# Patient Record
Sex: MALE | Race: WHITE | URBAN - METROPOLITAN AREA
[De-identification: names, ages, dates, MRNs, and addresses within clinical notes are randomized per-mention and may not be internally consistent; named-entity substitution may affect disease eponyms.]

---

## 2017-09-07 ENCOUNTER — OFFICE VISIT (OUTPATIENT)
Dept: ORTHOPEDIC SURGERY | Age: 52
End: 2017-09-07

## 2017-09-07 VITALS — HEIGHT: 68 IN | WEIGHT: 158 LBS | BODY MASS INDEX: 23.95 KG/M2

## 2017-09-07 DIAGNOSIS — M72.2 PLANTAR FASCIITIS, RIGHT: Primary | ICD-10-CM

## 2017-09-07 DIAGNOSIS — M79.671 PAIN OF RIGHT HEEL: ICD-10-CM

## 2017-09-07 PROCEDURE — 99203 OFFICE O/P NEW LOW 30 MIN: CPT | Performed by: INTERNAL MEDICINE

## 2017-09-07 PROCEDURE — L3040 FT ARCH SUPRT PREMOLD LONGIT: HCPCS | Performed by: INTERNAL MEDICINE

## 2017-09-07 PROCEDURE — 73650 X-RAY EXAM OF HEEL: CPT | Performed by: INTERNAL MEDICINE

## 2017-09-07 RX ORDER — ATORVASTATIN CALCIUM 20 MG/1
TABLET, FILM COATED ORAL
COMMUNITY
Start: 2017-08-21

## 2017-09-07 RX ORDER — NIACIN 750 MG/1
TABLET, EXTENDED RELEASE ORAL
COMMUNITY
Start: 2017-08-21

## 2017-09-07 RX ORDER — ASPIRIN 300 MG/1
300 SUPPOSITORY RECTAL EVERY 6 HOURS PRN
COMMUNITY
End: 2017-09-07 | Stop reason: CLARIF

## 2017-09-10 RX ORDER — MELOXICAM 15 MG/1
15 TABLET ORAL DAILY
Qty: 30 TABLET | Refills: 1 | Status: SHIPPED | OUTPATIENT
Start: 2017-09-10

## 2017-09-18 ENCOUNTER — HOSPITAL ENCOUNTER (OUTPATIENT)
Dept: PHYSICAL THERAPY | Age: 52
Discharge: HOME | End: 2017-09-30
Admitting: INTERNAL MEDICINE

## 2017-09-18 NOTE — FLOWSHEET NOTE
[place]/Biofeedback 10/10       G. Med activaiton/sidelying       G. Max Activation/prone       Hip Ext full ROM G. Activation       Bosu Bal and Prop- G Med       Single leg stance/Balance/Prop       Bosu Retro G. Med act                         Therapeutic Exercise and NMR EXR  [] (01033) Provided verbal/tactile cueing for activities related to strengthening, flexibility, endurance, ROM for improvements in LE, proximal hip, and core control with self care, mobility, lifting, ambulation.  [] (33768) Provided verbal/tactile cueing for activities related to improving balance, coordination, kinesthetic sense, posture, motor skill, proprioception  to assist with LE, proximal hip, and core control in self care, mobility, lifting, ambulation and eccentric single leg control.      NMR and Therapeutic Activities:    [] (37821 or 44428) Provided verbal/tactile cueing for activities related to improving balance, coordination, kinesthetic sense, posture, motor skill, proprioception and motor activation to allow for proper function of core, proximal hip and LE with self care and ADLs  [] (67877) Gait Re-education- Provided training and instruction to the patient for proper LE, core and proximal hip recruitment and positioning and eccentric body weight control with ambulation re-education including up and down stairs     Home Exercise Program:    [x] (77301) Reviewed/Progressed HEP activities related to strengthening, flexibility, endurance, ROM of core, proximal hip and LE for functional self-care, mobility, lifting and ambulation/stair navigation   [] (02815)Reviewed/Progressed HEP activities related to improving balance, coordination, kinesthetic sense, posture, motor skill, proprioception of core, proximal hip and LE for self care, mobility, lifting, and ambulation/stair navigation      Manual Treatments:  PROM / STM / Oscillations-Mobs:  G-I, II, III, IV (PA's, Inf., Post.)  [] (23795) Provided manual therapy to mobilize treatment well [] Patient limited by fatique  [] Patient limited by pain  [] Patient limited by other medical complications  [] Other:     Prognosis: [] Good [] Fair  [] Poor    Patient Requires Follow-up: [x] Yes  [] No    PLAN: See eval  [] Continue per plan of care [] Alter current plan (see comments)  [x] Plan of care initiated [] Hold pending MD visit [] Discharge    Electronically signed by: Sara Garber PT

## 2017-09-25 ENCOUNTER — HOSPITAL ENCOUNTER (OUTPATIENT)
Dept: PHYSICAL THERAPY | Age: 52
Discharge: HOME | End: 2017-09-25
Admitting: INTERNAL MEDICINE

## 2017-10-02 ENCOUNTER — HOSPITAL ENCOUNTER (OUTPATIENT)
Dept: PHYSICAL THERAPY | Age: 52
Discharge: HOME | End: 2017-10-02
Admitting: INTERNAL MEDICINE

## 2017-10-02 NOTE — FLOWSHEET NOTE
[place] , [facility]    Physical Therapy Daily Treatment Note  Date:  10/2/2017    Patient Name:  Haroon Penaloza    :  1965  MRN: [id number]  Restrictions/Precautions:    Medical/Treatment Diagnosis Information:  · Diagnosis: R plantar fasciitis  · Treatment Diagnosis: R foot pain  Insurance/Certification information:  PT Insurance Information: MVP - $0 deductible, $10 copay, 100% coinsurance, 240 PT/OT per calendar year  Physician Information:  Referring Practitioner: Dr. Russ Stuart of care signed (Y/N): N    Date of Patient follow up with Physician:     G-Code (if applicable):      Date G-Code Applied:         Progress Note: [x]  Yes  []  No  Next due by: Visit #10       Latex Allergy:  [x]NO      []YES  Preferred Language for Healthcare:   [x]English       []other:    Visit # Insurance Allowable   3 240     Pain level:  6/10     SUBJECTIVE:  Says that he had been feeling pretty good last week, but then was out with son at [place] over the weekend and did a lot of walking on the course and had some increased pain when he was finished, reminding him that it was not healed yet. OBJECTIVE: See eval  Observation:   Test measurements:      RESTRICTIONS/PRECAUTIONS: None    Exercises/Interventions:     Therapeutic Ex Resistance Sets/sec Reps Notes   Retro Stepper/BIKE       Sportcord March       3 way SLR       SAQ       Clam ABD       Hip Ext /table       Bosu fwd/side lunge       Slide Lunge       Leg Press Ecc 0-       Cybex HS curl       TKE       Slant board stretch Knee straight and knees bent 2' 1    Forward step downs 4\" 2 15    BOSU lunges Forward 1 20    Single leg stance BOSU 30\" 3    Mini squats BOSU 5\" 15    Manual Intervention       Knee mobs/PROM       Tib/Fem Mobs       Patella Mobs       Ankle mobs       Plantar fascia STM  10'     Dry neeling  15'     NMR re-education       [place] citizen/Biofeedback 10/10       G. Med activaiton/sidelying       G. Max Activation/prone       Hip Ext full ROM G. Activation       Bosu Bal and Prop- G Med       Single leg stance/Balance/Prop       Bosu Retro G. Med act       Ladder drills  10'                Therapeutic Exercise and NMR EXR  [] (18082) Provided verbal/tactile cueing for activities related to strengthening, flexibility, endurance, ROM for improvements in LE, proximal hip, and core control with self care, mobility, lifting, ambulation.  [] (50400) Provided verbal/tactile cueing for activities related to improving balance, coordination, kinesthetic sense, posture, motor skill, proprioception  to assist with LE, proximal hip, and core control in self care, mobility, lifting, ambulation and eccentric single leg control.      NMR and Therapeutic Activities:    [] (26339 or 25262) Provided verbal/tactile cueing for activities related to improving balance, coordination, kinesthetic sense, posture, motor skill, proprioception and motor activation to allow for proper function of core, proximal hip and LE with self care and ADLs  [] (15648) Gait Re-education- Provided training and instruction to the patient for proper LE, core and proximal hip recruitment and positioning and eccentric body weight control with ambulation re-education including up and down stairs     Home Exercise Program:    [x] (86973) Reviewed/Progressed HEP activities related to strengthening, flexibility, endurance, ROM of core, proximal hip and LE for functional self-care, mobility, lifting and ambulation/stair navigation   [] (38963)Reviewed/Progressed HEP activities related to improving balance, coordination, kinesthetic sense, posture, motor skill, proprioception of core, proximal hip and LE for self care, mobility, lifting, and ambulation/stair navigation      Manual Treatments:  PROM / STM / Oscillations-Mobs:  G-I, II, III, IV (PA's, Inf., Post.)  [x] (87044) Provided manual therapy to mobilize LE, proximal hip

## 2017-10-09 ENCOUNTER — HOSPITAL ENCOUNTER (OUTPATIENT)
Dept: PHYSICAL THERAPY | Age: 52
Discharge: HOME | End: 2017-10-09
Admitting: INTERNAL MEDICINE

## 2017-10-09 NOTE — FLOWSHEET NOTE
[facility] [address], [facility]    Physical Therapy Daily Treatment Note  Date:  10/9/2017    Patient Name:  Alida Palacios    :  1965  MRN: [id number]  Restrictions/Precautions:    Medical/Treatment Diagnosis Information:  · Diagnosis: R plantar fasciitis  · Treatment Diagnosis: R foot pain  Insurance/Certification information:  PT Insurance Information: MVP - $0 deductible, $10 copay, 100% coinsurance, 240 PT/OT per calendar year  Physician Information:  Referring Practitioner: Dr. Kerwin Yeh of care signed (Y/N): Y    Date of Patient follow up with Physician:     G-Code (if applicable):      Date G-Code Applied:         Progress Note: [x]  Yes  []  No  Next due by: Visit #10       Latex Allergy:  [x]NO      []YES  Preferred Language for Healthcare:   [x]English       []other:    Visit # Insurance Allowable   4 240     Pain level:  6/10     SUBJECTIVE:  Says that he is feeling okay today. Says that he was a little more sore yesterday without any real reason. Says that he was not up on his feet any more than usual yesterday    OBJECTIVE:   Observation:   Test measurements:      RESTRICTIONS/PRECAUTIONS: None    Exercises/Interventions:     Therapeutic Ex Resistance Sets/sec Reps Notes   Retro Stepper/BIKE       Sport[place] March       3 way SLR       SAQ       Clam ABD       Hip Ext /table       Bosu fwd/side lunge       Slide Lunge       Leg Press Ecc 0-       Cybex HS curl       TKE       Slant board stretch Knee straight and knees bent 2' 1    Forward step downs 4\" 2 15    BOSU lunges Forward 1 20    Single leg stance BOSU 30\" 3    Mini squats BOSU 5\" 15    Manual Intervention       Knee mobs/PROM       Tib/Fem Mobs       Patella Mobs       Ankle mobs       Plantar fascia STM  10'     Dry neeling  15'     NMR re-education       Citizen of [place]/Biofeedback 10/10       G. Med activaiton/sidelying       G. Max Activation/prone       Hip Ext full ROM G. Activation       Bosu Bal and Prop- G Med       Single leg stance/Balance/Prop       Bosu Retro G. Med act       Ladder drills  10'                Therapeutic Exercise and NMR EXR  [] (53976) Provided verbal/tactile cueing for activities related to strengthening, flexibility, endurance, ROM for improvements in LE, proximal hip, and core control with self care, mobility, lifting, ambulation.  [] (81124) Provided verbal/tactile cueing for activities related to improving balance, coordination, kinesthetic sense, posture, motor skill, proprioception  to assist with LE, proximal hip, and core control in self care, mobility, lifting, ambulation and eccentric single leg control.      NMR and Therapeutic Activities:    [] (07196 or 47603) Provided verbal/tactile cueing for activities related to improving balance, coordination, kinesthetic sense, posture, motor skill, proprioception and motor activation to allow for proper function of core, proximal hip and LE with self care and ADLs  [] (88867) Gait Re-education- Provided training and instruction to the patient for proper LE, core and proximal hip recruitment and positioning and eccentric body weight control with ambulation re-education including up and down stairs     Home Exercise Program:    [x] (36324) Reviewed/Progressed HEP activities related to strengthening, flexibility, endurance, ROM of core, proximal hip and LE for functional self-care, mobility, lifting and ambulation/stair navigation   [] (80738)Reviewed/Progressed HEP activities related to improving balance, coordination, kinesthetic sense, posture, motor skill, proprioception of core, proximal hip and LE for self care, mobility, lifting, and ambulation/stair navigation      Manual Treatments:  PROM / STM / Oscillations-Mobs:  G-I, II, III, IV (PA's, Inf., Post.)  [x] (91081) Provided manual therapy to mobilize LE, proximal hip and/or LS spine soft tissue/joints for the purpose of modulating pain, promoting relaxation,  increasing ROM, reducing/eliminating soft tissue swelling/inflammation/restriction, improving soft tissue extensibility and allowing for proper ROM for normal function with self care, mobility, lifting and ambulation. Modalities:     Charges:  Timed Code Treatment Minutes: 45   Total Treatment Minutes: 45     [] EVAL  [x] TE(67440) x  1   [] IONTO  [x] NMR (89683) x  1   [] VASO  [x] Manual (42427) x  1    [] Other:  [] TA x       [] Mech Traction (83202)  [] ES(attended) (62973)      [] ES (un) (07106):     GOALS:  Patient stated goal: Wants to get back to full activity and reduce pain when waking up     Therapist goals for Patient:   Short Term Goals: To be achieved in: 2 weeks  1. Independent in HEP and progression per patient tolerance, in order to prevent re-injury. 2. Patient will have a decrease in pain to facilitate improvement in movement, function, and ADLs as indicated by Functional Deficits.     Long Term Goals: To be achieved in: 6 weeks  1. Disability index score of 14% or less for the LEFS to assist with reaching prior level of function. 2. Patient will demonstrate increased AROM to at least 0-10 of R ankle DF ROM to allow for proper joint functioning as indicated by patients Functional Deficits. 3. Patient will return to walking at least 1 hour without increased symptoms or restriction. 4. Patient will be able to tolerate getting out of bed in the AM without c/o increased foot pain and antalgic gait        Progression Towards Functional goals:  [x] Patient is progressing as expected towards functional goals listed. [] Progression is slowed due to complexities listed. [] Progression has been slowed due to co-morbidities. [] Plan just implemented, too soon to assess goals progression  [] Other:     ASSESSMENT:  Tolerated treatment well with no adverse.  Still with some tightness in R gastroc    Treatment/Activity Tolerance:  [x] Patient tolerated treatment well [] Patient limited by fatique  [] Patient limited by pain  [] Patient limited by other medical complications  [] Other:     Prognosis: [x] Good [] Fair  [] Poor    Patient Requires Follow-up: [x] Yes  [] No    PLAN: Held needling today  [x] Continue per plan of care [] Alter current plan (see comments)  [] Plan of care initiated [] Hold pending MD visit [] Discharge    Electronically signed by: Maribel De La Paz PT

## 2017-10-16 ENCOUNTER — HOSPITAL ENCOUNTER (OUTPATIENT)
Dept: PHYSICAL THERAPY | Age: 52
Discharge: HOME | End: 2017-10-16
Admitting: INTERNAL MEDICINE

## 2017-10-16 NOTE — FLOWSHEET NOTE
[place] , [facility]    Physical Therapy Daily Treatment Note  Date:  10/16/2017    Patient Name:  Ruth Hammonds    :  1965  MRN: [id number]  Restrictions/Precautions:    Medical/Treatment Diagnosis Information:  · Diagnosis: R plantar fasciitis  · Treatment Diagnosis: R foot pain  Insurance/Certification information:  PT Insurance Information: MVP - $0 deductible, $10 copay, 100% coinsurance, 240 PT/OT per calendar year  Physician Information:  Referring Practitioner: Dr. Danis Birch of care signed (Y/N): Y    Date of Patient follow up with Physician:     G-Code (if applicable):      Date G-Code Applied:         Progress Note: [x]  Yes  []  No  Next due by: Visit #10       Latex Allergy:  [x]NO      []YES  Preferred Language for Healthcare:   [x]English       []other:    Visit # Insurance Allowable   5 240     Pain level:  6/10     SUBJECTIVE:  Still having soreness in the bottom of the foot. Says that he did not do anything different over the weekend. Says that it is just sitting right in arch, back by the heel. OBJECTIVE:   Observation:   Test measurements:      RESTRICTIONS/PRECAUTIONS: None    Exercises/Interventions:     Therapeutic Ex Resistance Sets/sec Reps Notes   Retro Stepper/BIKE       Sportcord March       3 way SLR       SAQ       Clam ABD       Hip Ext /table       Bosu fwd/side lunge       Slide Lunge       Leg Press Ecc 0-       Cybex HS curl       TKE       Slant board stretch Knee straight and knees bent 2' 1    Forward step downs 4\" 2 15    BOSU lunges Forward 1 20    Single leg stance BOSU 30\" 3    Mini squats BOSU 5\" 15    Manual Intervention       Knee mobs/PROM       Tib/Fem Mobs       Patella Mobs       Ankle mobs       Plantar fascia STM  10'     Dry neeling  15'     NMR re-education       [place]/Biofeedback 10/10       G. Med activaiton/sidelying       G. Max Activation/prone       Hip Ext full ROM G.

## 2017-10-23 ENCOUNTER — HOSPITAL ENCOUNTER (OUTPATIENT)
Dept: PHYSICAL THERAPY | Age: 52
Discharge: HOME | End: 2017-10-23
Admitting: INTERNAL MEDICINE

## 2017-10-23 NOTE — FLOWSHEET NOTE
G. Max Activation/prone       Hip Ext full ROM G. Activation       Bosu Bal and Prop- G Med       Single leg stance/Balance/Prop       Bosu Retro G. Med act       Ladder drills  10'                Therapeutic Exercise and NMR EXR  [] (83717) Provided verbal/tactile cueing for activities related to strengthening, flexibility, endurance, ROM for improvements in LE, proximal hip, and core control with self care, mobility, lifting, ambulation.  [] (79267) Provided verbal/tactile cueing for activities related to improving balance, coordination, kinesthetic sense, posture, motor skill, proprioception  to assist with LE, proximal hip, and core control in self care, mobility, lifting, ambulation and eccentric single leg control.      NMR and Therapeutic Activities:    [] (17374 or 65918) Provided verbal/tactile cueing for activities related to improving balance, coordination, kinesthetic sense, posture, motor skill, proprioception and motor activation to allow for proper function of core, proximal hip and LE with self care and ADLs  [] (10394) Gait Re-education- Provided training and instruction to the patient for proper LE, core and proximal hip recruitment and positioning and eccentric body weight control with ambulation re-education including up and down stairs     Home Exercise Program:    [x] (87545) Reviewed/Progressed HEP activities related to strengthening, flexibility, endurance, ROM of core, proximal hip and LE for functional self-care, mobility, lifting and ambulation/stair navigation   [] (69419)Reviewed/Progressed HEP activities related to improving balance, coordination, kinesthetic sense, posture, motor skill, proprioception of core, proximal hip and LE for self care, mobility, lifting, and ambulation/stair navigation      Manual Treatments:  PROM / STM / Oscillations-Mobs:  G-I, II, III, IV (PA's, Inf., Post.)  [x] (60599) Provided manual therapy to mobilize LE, proximal hip and/or LS spine soft tissue/joints for the purpose of modulating pain, promoting relaxation,  increasing ROM, reducing/eliminating soft tissue swelling/inflammation/restriction, improving soft tissue extensibility and allowing for proper ROM for normal function with self care, mobility, lifting and ambulation. Modalities:     Charges:  Timed Code Treatment Minutes: 45   Total Treatment Minutes: 45     [] EVAL  [x] MH(51298) x  1   [] IONTO  [x] NMR (98619) x  1   [] VASO  [x] Manual (54053) x  1    [] Other:  [] TA x       [] Mech Traction (35905)  [] ES(attended) (65371)      [] ES (un) (50933):     GOALS:  Patient stated goal: Wants to get back to full activity and reduce pain when waking up     Therapist goals for Patient:   Short Term Goals: To be achieved in: 2 weeks  1. Independent in HEP and progression per patient tolerance, in order to prevent re-injury. 2. Patient will have a decrease in pain to facilitate improvement in movement, function, and ADLs as indicated by Functional Deficits.     Long Term Goals: To be achieved in: 6 weeks  1. Disability index score of 14% or less for the LEFS to assist with reaching prior level of function. 2. Patient will demonstrate increased AROM to at least 0-10 of R ankle DF ROM to allow for proper joint functioning as indicated by patients Functional Deficits. 3. Patient will return to walking at least 1 hour without increased symptoms or restriction. 4. Patient will be able to tolerate getting out of bed in the AM without c/o increased foot pain and antalgic gait        Progression Towards Functional goals:  [x] Patient is progressing as expected towards functional goals listed. [] Progression is slowed due to complexities listed. [] Progression has been slowed due to co-morbidities. [] Plan just implemented, too soon to assess goals progression  [] Other:     ASSESSMENT:  Tolerated treatment well with no adverse.  Still with some tightness in R gastroc    Treatment/Activity Tolerance:  [x] Patient tolerated treatment well [] Patient limited by fatique  [] Patient limited by pain  [] Patient limited by other medical complications  [] Other:     Prognosis: [x] Good [] Fair  [] Poor    Patient Requires Follow-up: [x] Yes  [] No    PLAN: Held needling today  [x] Continue per plan of care [] Alter current plan (see comments)  [] Plan of care initiated [] Hold pending MD visit [] Discharge    Electronically signed by: Andria Gale PT

## 2017-10-30 ENCOUNTER — HOSPITAL ENCOUNTER (OUTPATIENT)
Dept: PHYSICAL THERAPY | Age: 52
Discharge: HOME | End: 2017-10-30
Admitting: INTERNAL MEDICINE

## 2017-10-30 NOTE — FLOWSHEET NOTE
and Prop- G Med       Single leg stance/Balance/Prop       Steamboats On bad with pink band 1 50 4 directions   Ladder drills  10'     Soleus press 45# 1 30        Therapeutic Exercise and NMR EXR  [] (24344) Provided verbal/tactile cueing for activities related to strengthening, flexibility, endurance, ROM for improvements in LE, proximal hip, and core control with self care, mobility, lifting, ambulation.  [] (93116) Provided verbal/tactile cueing for activities related to improving balance, coordination, kinesthetic sense, posture, motor skill, proprioception  to assist with LE, proximal hip, and core control in self care, mobility, lifting, ambulation and eccentric single leg control.      NMR and Therapeutic Activities:    [] (14029 or 29754) Provided verbal/tactile cueing for activities related to improving balance, coordination, kinesthetic sense, posture, motor skill, proprioception and motor activation to allow for proper function of core, proximal hip and LE with self care and ADLs  [] (53457) Gait Re-education- Provided training and instruction to the patient for proper LE, core and proximal hip recruitment and positioning and eccentric body weight control with ambulation re-education including up and down stairs     Home Exercise Program:    [x] (53728) Reviewed/Progressed HEP activities related to strengthening, flexibility, endurance, ROM of core, proximal hip and LE for functional self-care, mobility, lifting and ambulation/stair navigation   [] (17032)Reviewed/Progressed HEP activities related to improving balance, coordination, kinesthetic sense, posture, motor skill, proprioception of core, proximal hip and LE for self care, mobility, lifting, and ambulation/stair navigation      Manual Treatments:  PROM / STM / Oscillations-Mobs:  G-I, II, III, IV (PA's, Inf., Post.)  [x] (93923) Provided manual therapy to mobilize LE, proximal hip and/or LS spine soft tissue/joints for the purpose of modulating

## 2017-11-01 ENCOUNTER — HOSPITAL ENCOUNTER (OUTPATIENT)
Dept: PHYSICAL THERAPY | Age: 52
Discharge: HOME | End: 2017-11-30
Attending: INTERNAL MEDICINE | Admitting: INTERNAL MEDICINE

## 2017-11-06 ENCOUNTER — HOSPITAL ENCOUNTER (OUTPATIENT)
Dept: PHYSICAL THERAPY | Age: 52
Discharge: HOME | End: 2017-11-06
Admitting: INTERNAL MEDICINE

## 2017-11-06 NOTE — FLOWSHEET NOTE
[facility] , [facility]    Physical Therapy Daily Treatment Note  Date:  2017    Patient Name:  Quinton Deluca    :  1965  MRN: [id number]  Restrictions/Precautions:    Medical/Treatment Diagnosis Information:  · Diagnosis: R plantar fasciitis  · Treatment Diagnosis: R foot pain  Insurance/Certification information:  PT Insurance Information: [employer] - $0 deductible, $10 copay, 100% coinsurance, 240 PT/OT per calendar year  Physician Information:  Referring Practitioner: Dr. Joseph Kate of care signed (Y/N): Y    Date of Patient follow up with Physician:     G-Code (if applicable):      Date G-Code Applied:         Progress Note: [x]  Yes  []  No  Next due by: Visit #10       Latex Allergy:  [x]NO      []YES  Preferred Language for Healthcare:   [x]English       []other:    Visit # Insurance Allowable   8 240     Pain level:  6/10     SUBJECTIVE:  Says that he may have overdone things last night while playing basketball. Says that he was sore when he finished and had some soreness while he was playing. OBJECTIVE:   Observation:   Test measurements:      RESTRICTIONS/PRECAUTIONS: None    Exercises/Interventions:     Therapeutic Ex Resistance Sets/sec Reps Notes   Retro Stepper/BIKE       Sportcord March       3 way SLR       SAQ       Clam ABD       Hip Ext /table       Bosu fwd/side lunge       Slide Lunge       Leg Press Ecc 0-       Cybex HS curl       TKE       Slant board stretch Knee straight and knees bent 2' 1    Forward step downs 4\" 2 15    BOSU lunges Forward 1 20    Single leg stance BOSU 30\" 3    Mini squats BOSU 5\" 15    Manual Intervention       Knee mobs/PROM       Tib/Fem Mobs       Patella Mobs       Ankle mobs       Plantar fascia STM  10'     Dry neeling  15'     NMR re-education       Cymraes/Biofeedback 10/10       G. Med activaiton/sidelying       G. Max Activation/prone       Hip Ext full ROM G.  Activation       Bosu Bal and Prop- G Med       Single leg stance/Balance/Prop       Steamboats On bad with pink band 1 50 4 directions   Ladder drills  10'     Soleus press 45# 1 30        Therapeutic Exercise and NMR EXR  [] (22752) Provided verbal/tactile cueing for activities related to strengthening, flexibility, endurance, ROM for improvements in LE, proximal hip, and core control with self care, mobility, lifting, ambulation.  [] (88039) Provided verbal/tactile cueing for activities related to improving balance, coordination, kinesthetic sense, posture, motor skill, proprioception  to assist with LE, proximal hip, and core control in self care, mobility, lifting, ambulation and eccentric single leg control.      NMR and Therapeutic Activities:    [] (64911 or 21208) Provided verbal/tactile cueing for activities related to improving balance, coordination, kinesthetic sense, posture, motor skill, proprioception and motor activation to allow for proper function of core, proximal hip and LE with self care and ADLs  [] (95284) Gait Re-education- Provided training and instruction to the patient for proper LE, core and proximal hip recruitment and positioning and eccentric body weight control with ambulation re-education including up and down stairs     Home Exercise Program:    [x] (99940) Reviewed/Progressed HEP activities related to strengthening, flexibility, endurance, ROM of core, proximal hip and LE for functional self-care, mobility, lifting and ambulation/stair navigation   [] (00023)Reviewed/Progressed HEP activities related to improving balance, coordination, kinesthetic sense, posture, motor skill, proprioception of core, proximal hip and LE for self care, mobility, lifting, and ambulation/stair navigation      Manual Treatments:  PROM / STM / Oscillations-Mobs:  G-I, II, III, IV (PA's, Inf., Post.)  [x] (42869) Provided manual therapy to mobilize LE, proximal hip and/or LS spine soft tissue/joints for the purpose of

## 2017-11-13 ENCOUNTER — HOSPITAL ENCOUNTER (OUTPATIENT)
Dept: PHYSICAL THERAPY | Age: 52
Discharge: HOME | End: 2017-11-13
Admitting: INTERNAL MEDICINE

## 2017-11-13 NOTE — FLOWSHEET NOTE
therapy to mobilize LE, proximal hip and/or LS spine soft tissue/joints for the purpose of modulating pain, promoting relaxation,  increasing ROM, reducing/eliminating soft tissue swelling/inflammation/restriction, improving soft tissue extensibility and allowing for proper ROM for normal function with self care, mobility, lifting and ambulation. Modalities:     Charges:  Timed Code Treatment Minutes: 45   Total Treatment Minutes: 45     [] EVAL  [x] TY(19811) x  1   [] IONTO  [x] NMR (97544) x  1   [] VASO  [x] Manual (25983) x  1    [] Other:  [] TA x       [] Mech Traction (03969)  [] ES(attended) (14554)      [] ES (un) (77804):     GOALS:  Patient stated goal: Wants to get back to full activity and reduce pain when waking up     Therapist goals for Patient:   Short Term Goals: To be achieved in: 2 weeks  1. Independent in HEP and progression per patient tolerance, in order to prevent re-injury. 2. Patient will have a decrease in pain to facilitate improvement in movement, function, and ADLs as indicated by Functional Deficits.     Long Term Goals: To be achieved in: 6 weeks  1. Disability index score of 14% or less for the LEFS to assist with reaching prior level of function. 2. Patient will demonstrate increased AROM to at least 0-10 of R ankle DF ROM to allow for proper joint functioning as indicated by patients Functional Deficits. 3. Patient will return to walking at least 1 hour without increased symptoms or restriction. 4. Patient will be able to tolerate getting out of bed in the AM without c/o increased foot pain and antalgic gait        Progression Towards Functional goals:  [x] Patient is progressing as expected towards functional goals listed. [] Progression is slowed due to complexities listed. [] Progression has been slowed due to co-morbidities. [] Plan just implemented, too soon to assess goals progression  [] Other:     ASSESSMENT:  Tolerated treatment well with no adverse.

## 2017-11-27 ENCOUNTER — HOSPITAL ENCOUNTER (OUTPATIENT)
Dept: PHYSICAL THERAPY | Age: 52
Discharge: HOME | End: 2017-11-27
Admitting: INTERNAL MEDICINE

## 2017-11-27 NOTE — FLOWSHEET NOTE
hip and/or LS spine soft tissue/joints for the purpose of modulating pain, promoting relaxation,  increasing ROM, reducing/eliminating soft tissue swelling/inflammation/restriction, improving soft tissue extensibility and allowing for proper ROM for normal function with self care, mobility, lifting and ambulation. Modalities:     Charges:  Timed Code Treatment Minutes: 45   Total Treatment Minutes: 45     [] EVAL  [x] BH(18859) x  1   [] IONTO  [x] NMR (13957) x  1   [] VASO  [x] Manual (65056) x  1    [] Other:  [] TA x       [] Mech Traction (29487)  [] ES(attended) (94869)      [] ES (un) (97774):     GOALS:  Patient stated goal: Wants to get back to full activity and reduce pain when waking up     Therapist goals for Patient:   Short Term Goals: To be achieved in: 2 weeks  1. Independent in HEP and progression per patient tolerance, in order to prevent re-injury. 2. Patient will have a decrease in pain to facilitate improvement in movement, function, and ADLs as indicated by Functional Deficits.     Long Term Goals: To be achieved in: 6 weeks  1. Disability index score of 14% or less for the LEFS to assist with reaching prior level of function. 2. Patient will demonstrate increased AROM to at least 0-10 of R ankle DF ROM to allow for proper joint functioning as indicated by patients Functional Deficits. 3. Patient will return to walking at least 1 hour without increased symptoms or restriction. 4. Patient will be able to tolerate getting out of bed in the AM without c/o increased foot pain and antalgic gait        Progression Towards Functional goals:  [x] Patient is progressing as expected towards functional goals listed. [] Progression is slowed due to complexities listed. [] Progression has been slowed due to co-morbidities. [] Plan just implemented, too soon to assess goals progression  [] Other:     ASSESSMENT:  Tolerated treatment well with no adverse.  Still with some tightness in R gastroc    Treatment/Activity Tolerance:  [x] Patient tolerated treatment well [] Patient limited by fatique  [] Patient limited by pain  [] Patient limited by other medical complications  [] Other:     Prognosis: [x] Good [] Fair  [] Poor    Patient Requires Follow-up: [x] Yes  [] No    PLAN: Held needling today  [x] Continue per plan of care [] Alter current plan (see comments)  [] Plan of care initiated [] Hold pending MD visit [] Discharge    Electronically signed by: Bryson Raymond PT

## 2017-12-01 ENCOUNTER — HOSPITAL ENCOUNTER (OUTPATIENT)
Dept: PHYSICAL THERAPY | Age: 52
Discharge: HOME | End: 2017-12-31
Attending: INTERNAL MEDICINE | Admitting: INTERNAL MEDICINE

## 2024-03-18 NOTE — PLAN OF CARE
"  # s/p right eye Cataract extraction with intraocular lens implantation 03/12/2024   Ketorolac (gray top) Three times a day x 1 week, then twice a day x 1 week and then once a day till finish    Predforte  (pink top) Three times a day x 1 week, then twice a day x 1 week and then once a day till finish  (shake the bottle before)  Ofloxacin (tan top) four times a day  - stop   Maxitrol ointment at bedtime- stop   Put the eyedrops 5 minutes a part  Eye shield or glasses at all times x 3 weeks  Sleep with the shield x 2 weeks  No heavy lifting     Retina detachment and endophthalmitis precautions were discussed with the patient (increased blurry vision, drainage, new flashes, floaters or a curtain in the visual field) and was asked to return if any of the those occur    What to watch out for:  If you experience any of the following \"RSVP Symptoms\", you should call immediately:  Worsening Redness  Worsening Sensitivity to light  Worsening Vision, including new flashing lights or floaters  Worsening Pain, including nausea/vomiting  " LEFS  Score: 28%  Functional Limitation: Mobility: Walking and moving around  Mobility: Walking and Moving Around Current Status (): At least 20 percent but less than 40 percent impaired, limited or restricted  Mobility: Walking and Moving Around Goal Status (): At least 1 percent but less than 20 percent impaired, limited or restricted    Pain Scale: 5-6/10  Easing factors: inserts, rest   Provocative factors: increased activity     Type: []Constant   [x]Intermittent  []Radiating []Localized []other:     Numbness/Tingling: Denies numbness / tingling in the LE    Occupation/School:      Living Status/Prior Level of Function: Independent with ADLs and IADLs, active in basketball, soccer, ultimate frisbee and [employer] program    OBJECTIVE:     ROM LEFT RIGHT   Ankle PF 0-65 0-60   Ankle DF 0-15 0-5   Ankle In 0-40 0-40   Ankle Ev 0-30 0-25   Strength  LEFT RIGHT   Ankle DF 5/5 5/5   Ankle PF 5/5 5/5   Ankle Inv 5/5 5/5   Ankle EV 5/5 5/5     Reflexes/Sensation:    [x]Dermatomes/Myotomes intact    [x]Reflexes equal and normal bilaterally   []Other:    Joint mobility: Subtalar joint   []Normal    [x]Hypo   []Hyper    Palpation: Tender at medial calcaneal border    Functional Mobility/Transfers: Unremarkable    Posture: Unremarkable    Bandages/Dressings/Incisions: n/a    Gait: (include devices/WB status) Slight increased pronation on R vs. L during stance phase    Orthopedic Special Tests:                        [x] Patient history, allergies, meds reviewed. Medical chart reviewed. See intake form. Review Of Systems (ROS):  [x]Performed Review of systems (Integumentary, CardioPulmonary, Neurological) by intake and observation. Intake form has been scanned into medical record. Patient has been instructed to contact their primary care physician regarding ROS issues if not already being addressed at this time.       Co-morbidities/Complexities (which will affect course of rehabilitation):   [x]None ROM   []Decreased core/proximal hip strength and neuromuscular control   []Decreased LE functional strength   [x]Reduced balance/proprioceptive control   []other:      Functional Activity Limitations (from functional questionnaire and intake)   []Reduced ability to tolerate prolonged functional positions   [x]Reduced ability or difficulty with changes of positions or transfers between positions   []Reduced ability to maintain good posture and demonstrate good body mechanics with sitting, bending, and lifting   []Reduced ability to sleep   [] Reduced ability or tolerance with driving and/or computer work   []Reduced ability to perform lifting, carrying tasks   []Reduced ability to squat   []Reduced ability to forward bend   [x]Reduced ability to ambulate prolonged functional periods/distances/surfaces   []Reduced ability to ascend/descend stairs   [x]Reduced ability to run, hop, cut or jump   []other:    Participation Restrictions   []Reduced participation in self care activities   []Reduced participation in home management activities   []Reduced participation in work activities   [x]Reduced participation in social activities. [x]Reduced participation in sport/recreation activities. Classification :    []Signs/symptoms consistent with post-surgical status including decreased ROM, strength and function.    []Signs/symptoms consistent with joint sprain/strain   []Signs/symptoms consistent with patella-femoral syndrome   []Signs/symptoms consistent with knee OA/hip OA   []Signs/symptoms consistent with internal derangement of knee/Hip   []Signs/symptoms consistent with functional hip weakness/NMR control      [x]Signs/symptoms consistent with plantar fasciitis    [x]signs/symptoms consistent with pathology which may benefit from Dry needling      []other:      Prognosis/Rehab Potential:      [x]Excellent   []Good    []Fair   []Poor    Tolerance of evaluation/treatment: [x]Excellent   []Good    []Fair   []Poor    Physical Therapy Evaluation Complexity Justification  [x] A history of present problem with:  [x] no personal factors and/or comorbidities that impact the plan of care;  []1-2 personal factors and/or comorbidities that impact the plan of care  []3 personal factors and/or comorbidities that impact the plan of care  [x] An examination of body systems using standardized tests and measures addressing any of the following: body structures and functions (impairments), activity limitations, and/or participation restrictions;:  [x] a total of 1-2 or more elements   [] a total of 3 or more elements   [] a total of 4 or more elements   [x] A clinical presentation with:  [x] stable and/or uncomplicated characteristics   [] evolving clinical presentation with changing characteristics  [] unstable and unpredictable characteristics;   [x] Clinical decision making of [x] low, [] moderate, [] high complexity using standardized patient assessment instrument and/or measurable assessment of functional outcome. [x] EVAL (LOW) 18164 (typically 30 minutes face-to-face)  [] EVAL (MOD) 07729 (typically 30 minutes face-to-face)  [] EVAL (HIGH) 58277 (typically 45 minutes face-to-face)  [] RE-EVAL     PLAN:   Frequency/Duration:  1 days per week for 6 Weeks:  Interventions:  [x]  Therapeutic exercise including: strength training, ROM, for Lower extremity and core   [x]  NMR activation and proprioception for LE, Glutes and Core   [x]  Manual therapy as indicated for LE, Hip and spine to include: Dry Needling/IASTM, STM, PROM, Gr I-IV mobilizations, manipulation. [x] Modalities as needed that may include: thermal agents, E-stim, Biofeedback, US, iontophoresis as indicated  [x] Patient education on joint protection, postural re-education, activity modification, progression of HEP. HEP instruction: Patient instructed in, and demonstrated proper form of, exercises.  Copy of exercises scanned into media file    GOALS:  Patient stated goal: Wants to get back to full activity and reduce pain when waking up    Therapist goals for Patient:   Short Term Goals: To be achieved in: 2 weeks  1. Independent in HEP and progression per patient tolerance, in order to prevent re-injury. 2. Patient will have a decrease in pain to facilitate improvement in movement, function, and ADLs as indicated by Functional Deficits. Long Term Goals: To be achieved in: 6 weeks  1. Disability index score of 14% or less for the LEFS to assist with reaching prior level of function. 2. Patient will demonstrate increased AROM to at least 0-10 of R ankle DF ROM to allow for proper joint functioning as indicated by patients Functional Deficits. 3. Patient will return to walking at least 1 hour without increased symptoms or restriction.    4. Patient will be able to tolerate getting out of bed in the AM without c/o increased foot pain and antalgic gait      Electronically signed by:  Trung Moore PT